# Patient Record
(demographics unavailable — no encounter records)

---

## 2025-01-14 NOTE — DISCUSSION/SUMMARY
[FreeTextEntry1] : Labs sent including a lithium level and a proBNP.  If no new issues arise and no new symptoms he will follow-up in 6 months. [EKG obtained to assist in diagnosis and management of assessed problem(s)] : EKG obtained to assist in diagnosis and management of assessed problem(s)

## 2025-01-14 NOTE — REVIEW OF SYSTEMS
[Feeling Fatigued] : feeling fatigued [Negative] : Heme/Lymph [Weight Gain (___ Lbs)] : no recent weight gain [Weight Loss (___ Lbs)] : no recent weight loss [Dyspnea on exertion] : not dyspnea during exertion [Chest Discomfort] : no chest discomfort [Urinary Frequency] : no change in urinary frequency [FreeTextEntry8] : Not complaiNot complaining of urinary frequency today  [FreeTextEntry9] : Now using a cane [de-identified] : Parkinsonism.  See HPI [de-identified] : see HPI

## 2025-01-14 NOTE — ASSESSMENT
[FreeTextEntry1] : (Initially seemed somewhat better with the change in his psych medicines. 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 Booker does not wish to change. Was on a lower dose of lithium. Still having some urinary issues but now they sound more prostatic. (Now on his Parkinson's meds and lower dose of risperidone seems to be much better with ambulating, less shakes and tremors etc. He was very worried about hearing inflammation, lupus etc. so I will check sed rate ELLIOTT and rheumatoid factor for him. ) 2019-In the interim had a psych hospitalization as mentioned and with a lot of stress and using NSAIDs had a GI bleed. Ended up with an abnormal nuclear scan probably from his left bundle branch block and had coronary angiography that reportedly was totally normal. I will try to obtain the actual reports. Seems to be doing better overall but a little more anxious than usual and there is some stress with his wife now. He thinks his pharmacy may have a problem with valsartan in which case I will try a different medication but probably not losartan. He needed clearance from GI for colonoscopy for some reason so I took care of that. Otherwise, seems to be doing well. Echo 12/2018 without much change except for mild aortic stenosis. Finally here after a year and a half. Does not seem to have had any cardiac issues or Covid issues. Received the [employer] vaccine 4 weeks ago. Was not taking his Parkinson's medication and it shows. Exam and EKG are unchanged. He was in sinus rhythm with his usual left bundle left block. Heart rate of 62, no bradycardia. He thinks his muscle aches were better off the statin so we tried Crestor 10 mg starting in March 2017 and so far no complaints. I explained that he needs to cut down the ice cream because his LDL should be at least below 100 maybe lower given his low HDL. He seems to have finally resolved that his palpitations are from benign symptomatic VPCs. Today's EKG had no VPCs, whatsoever. He is off lithium but hopefully is following up with Dr. Booker. Seems happy with his primary care Dr. Liban Jackson in [place]. Urged him to take his Sinemet and to follow-up with neurology. Back after a 4 to 6-week hospitalization for physical therapy related to his parkinsonism's which they say helped a lot. All his psych meds and Parkinson meds have been changed as mentioned above. Claims compliant with his cardiac meds. Only on a tiny dose of Synthroid now as mentioned above is now on 320 mg of valsartan and tolerating it. Blood pressure initially high but then came down by the end of the exam. Weight up but no change in shortness of breath or CHF on exam (although his wife said he does complain of shortness of breath.). Continue to try to reassure the patient about his cardiomyopathy. Echo shows no change in the cardiomyopathy with some progression of the aortic stenosis although certainly not critical. Long discussion about trying to help his cardiomyopathy in the future but he keeps resisting trying Entresto or SGLT2 inhibitors for variety of reasons. No real change in his symptoms so for now we will stay with valsartan. Here together with his wife, September 29, 2022. Both seem more stable, back on lithium. Patient on some new medications for his Parkinson's and his bladder and is also on albuterol and seems to be doing well. AS murmur unchanged. EKG is sinus bradycardia and left bundle block also unchanged. Concerned about his weight gain and we discussed that but otherwise no cardiac symptoms and he seems very stable. Returns again with his wife about 6 months later and they seem to be doing better together and he seems happy with the [facility] clinic and his current regimen including lithium. He felt the albuterol was making him depressed so that was stopped. He complains of some on and off congestion which sounds more like just a postnasal drip and he does not seem to have any new cardiac complaints or findings. Remains in sinus rhythm with his left bundle branch block and mild first-degree AV block. Labs will be sent and if they are unchanged he will return in 5 months for follow-up visit; no echo quite yet unless significant change in BNP or new symptoms. Patient returned together with his wife September 7, 2023. Clinically mostly unchanged, maybe less depressed. Not as happy with the social work psychiatrist as his wife is. Does have shortness of breath with exertion which may be new the way he describes it but no exertional chest pressure or tightness. AS exam seems somewhat more impressive today and harder to appreciate S2 splitting so he will be scheduled for an echocardiogram. Labs were sent as well including lithium level and proBNP. If echo is still not revealing critical AS etc. he could have follow-up in 6 months. Patient returned with his wife, January 2, 2024 and he also had an echocardiogram. His previous proBNP was unchanged but he does now look like progression of his aortic stenosis on echo. Overall LV size function etc. seems unchanged but his gradient has gone up somewhat, his continuity equation is 0.96, and his dimensionless index has dropped to the borderline area. He seems asymptomatic by history but labs including proBNP will be checked again. We discussed his medications and he brought up whether he needs Entresto or other heart failure medications; so far is doing well on high-dose valsartan. He continues to work on his weight and is thinking of getting an exercise bicycle. He would like to get down to 210 which may or may not be realistic. We did discuss briefly the eventuality of everybody living long enough needing their aortic valve fixed and we will reevaluate next visit. As of now he will come back in 4 months with his wife; depending on that visit we can determine when his next echo should be He asked for lithium level and thyroid level as well. Patient returned again together with his wife May 7, 2024. Very concerned about tons of side effects, dopamine levels, other things he is read on the Internet. Has been off the pravastatin because of leg pains and has been taking Metamucil to "lower his cholesterol". We reviewed all of these issues and the whole general idea of medical side effects versus benefits and for now he will call me at the beginning of June about his leg pains and we will decide whether to be resume pravastatin or try a different statin. His aortic stenosis murmur sounds worse and there had been some progression on his January echo. Again he definitely denies exertional chest pain or shortness of breath or change in his aerobic capacity, possibly less reliable as his wife says he is not really that active. His proBNP's have been normal or minimally elevated so we will recheck that today. If unchanged we will probably repeat the echo with his next visit in 4 months. He asked for a number of additional labs to be sent as mentioned and these will be checked. 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 He sees a Dr. Jackson ([phone]) and a Dr. Bazzi ([phone]). One is his primary care and 1 is his endocrinologist. Obviously he is still seeing his psychiatrist Dr. Valdes ([phone]). Patient returned September 17, 2024 along with his wife for follow-up and echo. Clinically he seems stable overall. Certainly no aortic stenosis symptoms and any current change in his status from a cardiac point of view. Exam and EKG are unchanged and his echo appears to be virtually unchanged from January of a still significant but not critical valve area, similar gradient, similar LV function etc. No symptoms of heart failure and no symptoms of dizziness from either his valve or his conduction disease. Discussed diffuse psychiatric issues and other issues and he will check with his psychiatrist further about whether to take low-dose lithium, whether he should still be driving etc. Labs sent and if everything is okay he will come back just for a clinical follow-up visit in 4 months.      Patient returns today January 14, 2025. On low-dose lithium as mentioned and he is concerned that he has to live with either mild tardive dyskinesia or mild drug-induced parkinsonian is him.  No cardiac complaints and his echo last September showed his aortic stenosis to still be moderate and not severe and not causing any issues at this time.  He is fairly stable in terms of his blood pressure, his exam, and his ECG which remains left bundle branch block and mild first-degree AV block.  No bradycardic symptoms.

## 2025-01-14 NOTE — REASON FOR VISIT
[FreeTextEntry1] : Follow up visit for patient with chronic left bundle branch block and mild cardiomyopathy with normal coronaries, frequent VPC's.

## 2025-01-14 NOTE — PHYSICAL EXAM
[General Appearance - Well Developed] : well developed [Normal Appearance] : normal appearance [Well Groomed] : well groomed [General Appearance - Well Nourished] : well nourished [No Deformities] : no deformities [General Appearance - In No Acute Distress] : no acute distress [Normal Conjunctiva] : the conjunctiva exhibited no abnormalities [Eyelids - No Xanthelasma] : the eyelids demonstrated no xanthelasmas [Normal Oral Mucosa] : normal oral mucosa [No Oral Pallor] : no oral pallor [No Oral Cyanosis] : no oral cyanosis [Normal Jugular Venous A Waves Present] : normal jugular venous A waves present [Normal Jugular Venous V Waves Present] : normal jugular venous V waves present [No Jugular Venous Mccann A Waves] : no jugular venous mccann A waves [Respiration, Rhythm And Depth] : normal respiratory rhythm and effort [Exaggerated Use Of Accessory Muscles For Inspiration] : no accessory muscle use [Auscultation Breath Sounds / Voice Sounds] : lungs were clear to auscultation bilaterally [Heart Rate And Rhythm] : heart rate and rhythm were normal [Murmurs] : no murmurs present [Abdomen Soft] : soft [Abdomen Tenderness] : non-tender [Abdomen Mass (___ Cm)] : no abdominal mass palpated [Abnormal Walk] : normal gait [Gait - Sufficient For Exercise Testing] : the gait was sufficient for exercise testing [Nail Clubbing] : no clubbing of the fingernails [Cyanosis, Localized] : no localized cyanosis [Petechial Hemorrhages (___cm)] : no petechial hemorrhages [Skin Color & Pigmentation] : normal skin color and pigmentation [] : no rash [No Venous Stasis] : no venous stasis [Skin Lesions] : no skin lesions [No Skin Ulcers] : no skin ulcer [No Xanthoma] : no  xanthoma was observed [Oriented To Time, Place, And Person] : oriented to person, place, and time [Affect] : the affect was normal [Mood] : the mood was normal [FreeTextEntry1] : Mostly at his baseline

## 2025-01-14 NOTE — CARDIOLOGY SUMMARY
[No Ischemia] : no Ischemia [___] : [unfilled] [LVEF ___%] : LVEF [unfilled]% [None] : no pulmonary hypertension [Enlarged] : enlarged LA size [Mild] : mild mitral regurgitation [Normal] : normal .

## 2025-01-14 NOTE — HISTORY OF PRESENT ILLNESS
[FreeTextEntry1] : (MED HX) I've known the patient since January of 2003 when he was found to have a left bundle branch block and a murmur. Initial workup found him to have an abnormal adenosine sestamibi with left ventricular ejection fraction of 38%. he had cardiac catheterization which showed  totally normal coronary arteries and an LVEF of 50% with slight global hypokinesis.  He was treated with a beta blocker and ACE inhibitor along with his psychiatric medications including lithium and Haldol. He has had some issues with tremors but no cardiac issues. He developed hyperthyroidism in March of 2007 and underwent radioactive iodine treatment.  He was started on statins in 2008 due to hyperlipidemia.  His beta blocker was stopped because of fatigue and depression and he seemed to improve off the bystolic. His tremors have gotten much better when he recently started Cogentin 2 mg b.i.d. He had been on Cogentin  many years ago but does not seem to recall this.  July 18, 2013. Patient is here for followup. He is concerned about his short-term memory recently. He is also concerned about carotid artery disease as his father and sisters all had strokes and his sister had to have an endarterectomy at [facility]. He had a carotid Doppler in November of 2012 and had no hemodynamically significant blockages He has complained of loss of appetite about 4 weeks ago, and 3 weeks ago he saw Dr. Correa And also complained about fatigue. Dr. Correa told him to see me as to whether the weakness of his heart muscle could be causing the fatigue. He denies shortness of breath other than when he exercises. There is no PND orthopnea or edema. In addition he has some back pain when he overexerts himself like trying to walk more than 5 miles. But no chest pain and he does not get the back pain with normal walking or going up stairs. His fatigue it sounds more like a lack of interest then an actual inability to do more and may be related to depression. His exam was unremarkable Other than his aortic stenosis murmur being somewhat louder. His EKG still had a left bundle branch block. He was advised to come back for another echocardiogram. June 26, 2014. The patient is here for followup. He has done very well since his last visit, losing weight and feeling pretty good about himself. His main complaint is palpitations which he gets on and off which seemed to be related to caffeine. He stop the caffeine for a while and the palpitations went away but he was feeling really fatigued during the day especially when driving. He went back to caffeine and feels much better. We had a long discussion about this. No interval hospitalizations or procedures. All his medications for now on the same. He did ask for a PSA along with his lithium level. Based on his history and symptoms he was told to schedule a stress test and echocardiogram with his next visit. January 15, 2015. Patient is here for followup because I would not renew his medications as it had been more than 6 months. When the  try to schedule a stress test and echocardiogram at this visit as well he became very angry and refused. He is here now for followup. He is thinking of retiring but he will get another job, but this has made his wife unhappy. He has put on weight as much is 17 pounds and he does have more dyspnea on exertion but no chest pain. His medications are the same. He asked for a flu shot and a pneumonia shot. Labs were excellent with LDL 92 and hemoglobin A1c 5.2 July 9, 2015. Patient is here for followup with stress echocardiogram. He retired in February but now is looking for another job as he cannot stay idle. He had an episode of some back pain while hiking but he was carrying a heavy knapsack. No chest pain and he has not had a recurrence of that back pain since. On the stress echocardiogram he has abnormal septal motion from his left bundle branch block but no evidence of ischemia. In recovery he did have frequent VPCs but no V. tach.  His aerobic capacity is clearly diminished. The patient does have a mild cough which he thinks could be from the Ramipril. It is not constant but we can try changing to an ARB and see how he does. October 27, 2015. Patient is here for followup. 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 He had been drinking a lot of green tea which has a lot of caffeine and theophylline and finally stopped about one week ago. No exertional chest pain or shortness of breath but does get short of breath bending over to his shoes etc. May 5, 2016. Patient is here for followup. The symptomatic VPCs are bothering him still and he gets nervous when he reads about them. He is also complaining of shortness of breath, but it's mostly when he bends over to pick things up or tie his shoes (he does have central obesity). He claims walking and doing stairs is no different. We did discuss whether he will be a candidate for a VPC induced cardiomyopathy, if the frequency and number of VPCs was truly that great. In the interim, there have been no hospitalizations or emergency room visits. On his own he went back on Cogentin because of the Parkinson's side effects from his Haldol, and he is taking 2 mg twice a day. He does drink a lot of caffeine, and his wife keeps pressuring him to get another job now that he is retired. November 3, 2016. Patient have a followup. Was recently able to be talked down from palpitations, which have been just symptomatic VPC's. He has gained some weight since he has been retired. No other significant complaints. He was placed on and adjusted Cogentin, by Dr. Booker recently. January 3, 2016. Patient is here in followup. No specific complaints except for muscle aches from the simvastatin, which he stopped, and the muscle aches have gone away.  He had labs on December 29 with cholesterol 209, HDL 42, . In addition, his glucose was 113, but his hemoglobin A1c was only 5.6. Normal TSH of 2.6. Normal lithium level of 1.2. Normal BNP of 17.2. He has no new complaints although his tremor seems a little worse. His EKG still shows sinus rhythm with his left bundle branch block. He has not been watching his diet and his weight is fluctuating up and down. His wife blames it on FDC and that they sit around and just eat. March 9, 2017. A couple of weeks ago he went to the emergency room because he was feeling somewhat weak and unsteady and checked his pulse and thought it was in the 30s or 40s. In the emergency room he was his usual sinus rhythm with left bundle branch block, but with VPCs. Efective heart rate was not significantly bradycardic. Upon reviewing the patient's symptoms with himself and his wife they seem to be more gradual since he has been retired. He may be having some tremors from the Haldol. He may be becoming depressed from his FDC, but is afraid of antidepressants crelating a manic episode. Dr. Booker, did want him to cut back his lithium from 5 to four and I did mention that lithium can slow the heart rate. His endocrinologist wanted to set him up with a Holter monitor. May 1, 2017. Patient here in followup together with his wife. Now on Crestor 10 mg daily. On his lower dose of lithium and current dose of Haldol his tremors are much better. He does not have many complaints, although his wife, says he's a little stressed out about work. Not complaining as much about his palpitations and extra beats. Switching to a new endocrinologist for his hypothyroidism July 25, 2018. Patient is here in followup. Last visit more than a year ago. Issues mostly fatigue, tiredness, some balance issues, etc. Actually went to urgent care with those complaints last night, and they just sent him straight to the emergency room, but he left after waiting too long for a workup. No chest pain, shortness of breath, syncope, near syncope, etc. No interval medical issues. Still on Cogentin, Haldol, Synthroid, Crestor, valsartan, lithium. Also, worrying if he could be developing neuropathy, such as diabetic. (Last stress and echo 2015.) December 13, 2018. Patient here for followup and echocardiogram. Echo mostly unchanged with just a little bit of aortic stenosis now and maybe a slight decrease in LVEF. No other change. Patient now on risperidone and finally seeing an endocrinologist and needs blood work. Still complains about his balance at times. Still on lithium.. May 15, 2019. Here for followup. Was doing fine on the rosuvastatin, but continued to have problems with his posture and kyphosis and thought it might be related to the statin so he stopped it 2-1/2 weeks ago. However, I reassured him this is not one of the side effects of statins. In the meantime, he is still dealing with polyuria and polydipsia from his lithium and Dr. Booker, does not want to change the medication. Otherwise, he seems to be doing fine, except complaining of some shaking and tremulousness, which is probably from the risperidone. August 28, 2019.  Patient here in follow-up.  He saw a doctor in [place] Dr. West who thinks he has parkinsonism and has adjusted much of his medication including adding levodopa carbidopa, cutting back on some of the lithium, and overall other changes.  He probably told him about a high sed rate and mentioned about inflammation and the patient was concerned because his primary doctor said he might have to test him for lupus.  He has been having problems with frequent urination and has a urine culture pending but they thinks he either has prostatitis or other prostate problems.  He does have a urologist to see after he follows up with his nephrologist.  His wife had major changes and is off her lithium and she does not think that he is handling his cutting back the lithium as well as she is handling her's.  He admits he is not watching his diet and if his cholesterol is up he would rather increase his medicine than have to go on a strict diet. October 23, 2019.  Patient admitted to [facility] because of psychiatric reasons and developed a GI bleed. For some reason a nuclear stress test was done and he was told of a blockage but is afraid of an angiogram because he just had a bleed and will need to be on blood thinners. Patient denies any symptoms whatsoever. Heart rate was around 38. I explained to the patient that if he has no symptoms there is no emergency need for the angiogram and he could just treat the GI bleed and when he is discharged he will come here I will review the report and we will discuss whether he needs an angiogram. If the doctors there think he needs an urgent angiogram they have to call me and explain to me.  Angiogram was done and there were no blockages. November 25, 2019.  Patient called because he is having aches and pains and was worried that that was congestive heart failure. I reassured him it was not. Could be related to his statin. He did cut down 1 day a week on the Crestor thinks there might be some improvement so I told him to cut down a little more until his upcoming appointment in December.  December 18, 2019.  Patient here in follow-up.  Remains with sinus rhythm at 70 with a left bundle branch block.  Doing well overall.  Occasional palpitations and anxiety.  Blood pressure on repeat is excellent on valsartan but he thinks his pharmacy will not be carrying it soon and they offered losartan which I am not happy with so we will have to look into different Sartan's if necessary.  Had an issue with his wife who was off her medications and he was yelling at her and she called the spouse abuse line and the  came etc.  She ended up in a psych hospital he ended up in a psych hospital a little bit later as above.  They seem to be doing okay now although there is still little tension between them December 19, 2019. Lipids elevated again. Turns out he had leg pains and stopped the Crestor 6 weeks ago and the leg pains are gone. Had problems with simvastatin and atorvastatin in the past. Willing to try pravastatin so I will start him at just 20 mg daily for now.  Lipids elevated again. Turns out he had leg pains and stopped the Crestor 6 weeks ago and the leg pains are gone. Had problems with simvastatin and atorvastatin in the past. Willing to try pravastatin so I will start him at just 20 mg daily for now.  April 23, 2020. Telehealth visit. Narrative: Patient has been doing well from a cardiac point of view. Still gets his skipped beats but is confident that they are his VPCs and not atrial fibrillation. No lightheadedness dizziness chest pain or shortness of breath. He has been emphatic that his Parkinson's has a "psychogenic component as much if not more than organic" and he has been slowly decreasing his L-dopa although he is doing it in conjunction with his psychiatrist Dr. Booker and his Parkinson's specialist who told him if the symptoms start to come back go back up on the L-dopa. He is tolerating the Pravachol at a low dose and he is not having any muscle pains. He works out with just 1 pound weights but does a lot of repetitions which he thinks is better for him. He said by staying home from COVID-19 he has gained 10 to 15 pounds but says he weighs 255 which is the last weight I had on him last year. Overall sounds pretty stable from a cardiac point of view. Assessment: Stable from a cardiac point of view. No congestive heart failure despite his cardiomyopathy. Symptomatic VPCs but no atrial fibrillation most likely. We discussed his Parkinson's medicines in detail as well as his cardiac medicines. Tolerating the lower dose of Pravachol so hopefully we can get some blood tests when the social isolation restrictions are over. Plan: Continue current medications. Continue follow-up with his psychiatrist and his Parkinson's neurologist. No change in cardiac medicines at this time. Blood pressure has been well controlled on this regimen. October 15, 2020. Saw neurologist yesterday who now thinks it is not Parkinson's but an intention tremor.  Wants to try the patient on low-dose propranolol LA 60.  Patient had been on bisoprolol and it was stopped and sometimes had been bradycardic with left bundle branch block.  Last few visits his heart rate was in the 60s and once in the 70s.  Okay to try and if he gets lightheaded dizzy etc. he will let me know.  Otherwise he will make appointment soon  May 26, 2021.  Patient finally here in follow-up as I refused to do another telehealth visit.  He had labs done on the 13th with BUN 24, creatinine 1.28, potassium 4.7.  Cholesterol 206, HDL 36,  (he and his wife have ice cream every single night) triglycerides 269.  Normal thyroid function.  Normal CBC.  PSA 1.0.  Hemoglobin A1c 5.5.  No BNP was done.  He has occasional atypical chest pain but no exertional symptoms.  For some reason somebody told him that Sinemet causes people to go crazy and he did not take the Parkinson's medication.  As he is sitting on the exam table he is leaning towards the right which is a sign of Parkinson's as I explained to him.  I told him how well most people do with Sinemet and they do not go crazy and urged him to take the medication.  He had the [employer] vaccine 4 weeks ago.  No interval hospitalizations or medical changes etc.  Is not taking lithium.  EKG is sinus at 62 with his usual left bundle branch block.  Last echo was December 2018. Elvia 10, 2021.  Patient called.  First he was worried about sleeping a lot whether that was a symptom of heart failure and I told him it was not.  He has a shuffling gait and is worried about falling over which I told him was not a cardiac symptom either and he is going to see a neurologist on the 15th.  He asked me if I ever heard of the disappearing penis syndrome and I told him I did not and he should speak to his urologist.  Lastly he asked me about the upcoming echo which will be on the 21st and I reassured him and told him we will review those results right afterwards.  June 21, 2021.Patient came for echo because of increased BNP.  Mild MR and mild MS.  Mild AAS and no AI.  Mild LAE.  Mild global systolic dysfunction.  Septal motion consistent with left bundle branch block but mild LVE with LVEF probably 40 to 45%.  Mild diastolic dysfunction.  Normal RV size and function with minimal TR.  Normal pericardium.  June 22, 2021.  Reviewed the above with patient.  Not really low enough to start Entresto and not that symptomatic.  Will try doubling up the valsartan to 320 mg and reevaluate with labs for kidney function, potassium, and repeat BNP in 2 to 3 weeks  July 20, 2021.  Patient returns in follow-up, now on the higher dose of valsartan.  Remains in sinus rhythm at 66 with his left bundle branch block.  He is tolerating the higher dose.  His wife says there is good news because they found some answers to his worsening parkinsonism and there will be some drug changes being made in the near future.  Not clear that he is symptomatic from his cardiomyopathy and he just keeps saying I am not going to die right? January 27, 2022.  Patient finally returns together with his wife.He remains in sinus rhythm at 74 with a left bundle branch block.  Borderline first-degree AV block. Was in rehab for Parkinson's in October for 4-6 weeks; helped a lot.  He is off most of his psychotropic medicines which may have been exacerbating his parkinsonism and instead is on amantadine, Zyprexa a lower dose of lithium and it tiny dose of Synthroid only as the higher dose was giving him delusional thoughts and this did improve lowering it to 0.025.  He is also off any prostate medications and has been trying to get by with frequent prostate massage.  He does remain on pravastatin 20 and on valsartan 320.  He denies chest pain or shortness of breath although his wife says he does have shortness of breath with exertion.  They no longer see Dr. Booker and the psych person that he and his wife see is a PhD not an MD and he cannot prescribe drugs. April 28, 2022.  Patient here together with his wife for follow-up and for an echocardiogram.  His echo has about the same degree of LV dysfunction which is mostly global with an LVEF of about 45% and abnormal septal motion from his left bundle branch block.  He does have some progression of his aortic stenosis but it is not critical.  He remains in sinus rhythm with a left bundle branch block.  He has significant issues with urinary incontinence which he was blaming on the Parkinson's.  He does not think the urologist really offer him any help other than telling him to wear absorbent diapers which (sucks).  He now uses a cane to walk because he does not want to fall.  He keeps saying he wants to do something to strengthen his heart muscle but has not lost weight and when I discussed the new medications for him he is concerned both about the cost and about the possibility that they may make him urinate even more so he is happy staying with a high dose of valsartan. September 29, 2022.  Here with his wife.  Is on a new medicine for his bladder and seems to be helping and he is happy about that.  Has had some adjustment in his Parkinson's medicines.  He is also on albuterol on a regular basis now.  Claims he is going to the gym 5 days a week but only recently as he has gained another 10 pounds which he understands is mostly from just eating too much.  No cardiac issues in terms of chest pain, shortness of breath, syncope or near syncope or palpitations.  Like his wife he also never had COVID so I urged them both to get the new bivalent COVID-vaccine. On Lithium. October 27, 2022. Patient called because he is trying to make sure he does not have neuroleptic malignant syndrome.  He knows it has to do with dopamine like his parkinsonism.  He is really having trouble walking and his legs are very stiff etc. so is just trying to find anything possible to help him.  He is discussing with his other doctors as well.  I told him it is not likely that he has it, they usually have high fevers rapid heartbeats etc.  He will continue follow-up with his other doctors and keep his regular appointment with me  March 23, 2023.  Patient here with his wife, both seem to be in better spirits and not precluding much.  He does exercise almost every day but most of it is not aerobic so I encouraged a little more of the aerobic type.  He is on lithium and seems to be doing well.  His EKG continues to be sinus at 65 with his left bundle branch block and a mild first-degree AV block.  He has some congestion type symptoms which is probably postnasal drip and does not sound cardiac in nature.  No interval medical or COVID issues and seems happy with his therapist at the [facility]. Based on labs we increased his pravastatin to 40 mg daily. September 7, 2023.  Patient is here in follow-up together with his wife.  He remains in sinus rhythm at 68 with his usual left bundle branch block.  His weight is down 6 pounds and his blood pressure is acceptable.  No real complaints, no interval changes except now on 40 mg of pravastatin. January 2, 2024.  Patient here in follow-up with his wife and he is here for an echo as well.  The echo overall is unchanged except for some progression of his aortic stenosis although still not critical; higher gradients in the supraclavicular images.  EKG remains sinus rhythm at 75 with his usual left bundle branch block.  His blood pressure is excellent, his weight is down 12 more pounds, he is anxious and when he walks from the parking lot to the office feels anxiety and a little bit unsteady gait but absolutely denies more shortness of breath with exertion or chest pressure or tightness etc.  No signs or symptoms of fluid overload but he is anxious about developing heart failure.  We reviewed much of the issues with aortic stenosis as well. April 11, 2024.Significant leg pains but also he thinks his lithium levels went up affecting his kidneys. In any case it is okay to stop the pravastatin for a few weeks and see if the leg pains go away. He will call me after that. May 7, 2024.  Patient here in follow-up.  Thinks his leg pains may have improved but not definite and he thinks he has tons of symptoms and side effects from tons of medications all of which are nonspecific so it is difficult to determine.  We agreed to wait till the beginning of June and he will call me and if his leg pains are all gone we will either rechallenge with pravastatin or change to a different 1.  If no change we will resume.  When questioned carefully he does not have any change in his exercise capacity with no exertional chest pressure or shortness of breath with stairs or walking level ground etc.  (His wife says he does not really do that much but he denies).  He brought in a list of his other doctors and the list of medications that he needs and he is concerned about the amount of zinc and fixed so did not and wanted his zinc level checked as well.  Lithium level is necessary as well. September 17, 2024.  Patient returns here in follow-up and for echocardiogram, together with his wife.  Has no cardiac symptoms in terms of exertional chest pain or any change in his dyspnea on exertion.  Not complaining of palpitations dizziness near syncope etc.  His echo today is virtually unchanged from January still with a significant valve area and a decent gradient and with paradoxical septal motion from his left bundle branch block and mild decreased LVEF but again unchanged.  He is off lithium for a while and is thinking about taking a small level for supplementation that he read about on the Internet.  Also some issues of bad anxiety with driving and he will talk to his psychiatrist about whether he should still be driving or not.  Spirits seem to be okay.  EKG remains sinus rhythm at 68 with his left bundle branch block and mild first-degree AV block.  We discussed all the upcoming vaccines that they should be taking. December 6, 2024. Patient called today complaining of some may be tongue swelling and other issues but also restless leg syndrome and other side effects etc. hard to pin down on 1 thing but I told him to stop the valsartan for Saturday and Sunday and then call me Monday and we will reevaluate.  December 11, 2024. Patient called and left message that he is going back to the 320 of valsartan that it did not seem to make any difference. January 14, 2025.  Patient here in follow-up. Is back on valsartan 320 mg without a problem Is now back on low-dose lithium sounds like only 10 mg a day as part of a strategy to maintain the medicines he needs while filing off some degree of tardive dyskinesia and/or drug-induced parkinsonism.  No cardiac complaints.  Blood pressure excellent and EKG remains sinus rhythm at 65 with his usual left bundle branch block and mild first-degree AV block. .(His wife is here with him and she is getting a medical clearance for cataract surgery).